# Patient Record
Sex: FEMALE | Race: WHITE | URBAN - METROPOLITAN AREA
[De-identification: names, ages, dates, MRNs, and addresses within clinical notes are randomized per-mention and may not be internally consistent; named-entity substitution may affect disease eponyms.]

---

## 2022-09-12 ENCOUNTER — TELEPHONE (OUTPATIENT)
Dept: NEUROSURGERY | Age: 42
End: 2022-09-12

## 2022-09-12 NOTE — TELEPHONE ENCOUNTER
11/18 /2015 SPINAL CORD STIMULATOR REMOVAL @ [facility]    PT CALLED TODAY @ 2:30PM ASKING IF THE LEADS WERE REMOVED AT THE TIME OF THE REMOVAL SURGERY? SHE WAS SCHEDULED FOR AN MRI, AND SHE NEEDED TO KNOW IF THE UNIT AND LEADS WERE BOTH REMOVED. PT STATES [employer] WAS CALLED, AND THEY THINK SHE STILL HAS THE UNIT. I WILL LET THEM KNOW THAT IT HAS BEEN REMOVED. PLEASE ADVISE ON LEADS.

## 2022-09-15 NOTE — TELEPHONE ENCOUNTER
PATIENT WANTED [employer] TO REMOVE HER STIMULATOR UNIT OUT OF THEIR SYSTEM SINCE IT HAS BEEN REMOVED. THE FOLLOWING E-MAIL WAS SENT TODAY @ 4:20PM TO Suzette Whittington, [employer] REP    Hello,    Patient Castro Foster  ( [birth year]) had her stimulator inserted at [facility] on 13. It was removed 2015. She recently had to call [employer] regarding the leads before she could have an MRI. The patient reports that [employer] still shows her as having this unit. The patient is asking if you can correct this in your system since the unit was removed by Dr. Ajith Brumfield 2015. Thank you!